# Patient Record
Sex: MALE | Race: WHITE | NOT HISPANIC OR LATINO | Employment: FULL TIME | URBAN - METROPOLITAN AREA
[De-identification: names, ages, dates, MRNs, and addresses within clinical notes are randomized per-mention and may not be internally consistent; named-entity substitution may affect disease eponyms.]

---

## 2020-07-30 ENCOUNTER — OFFICE VISIT (OUTPATIENT)
Dept: SURGERY | Facility: CLINIC | Age: 55
End: 2020-07-30
Payer: COMMERCIAL

## 2020-07-30 VITALS
DIASTOLIC BLOOD PRESSURE: 84 MMHG | HEIGHT: 67 IN | HEART RATE: 72 BPM | BODY MASS INDEX: 40.12 KG/M2 | SYSTOLIC BLOOD PRESSURE: 148 MMHG | WEIGHT: 255.6 LBS | TEMPERATURE: 96.5 F

## 2020-07-30 DIAGNOSIS — E66.01 MORBID OBESITY (HCC): Primary | ICD-10-CM

## 2020-07-30 DIAGNOSIS — K42.9 UMBILICAL HERNIA WITHOUT OBSTRUCTION OR GANGRENE: ICD-10-CM

## 2020-07-30 PROCEDURE — 99203 OFFICE O/P NEW LOW 30 MIN: CPT | Performed by: SURGERY

## 2020-07-30 RX ORDER — CITALOPRAM 40 MG/1
1 TABLET ORAL DAILY
COMMUNITY
Start: 2020-07-06

## 2020-07-30 NOTE — PROGRESS NOTES
Fer Wells MD, 20     Gopi Floyd MRN: [id number] : 1965 Age: 54 y o  Assessment/Plan       Diagnoses and all orders for this visit:    Morbid obesity ([address] )  -Ambulatory referral to Weight Management; Future  -Encouraged patient to lose some weight and visit weight loss clinic    Umbilical hernia without obstruction or gangrene  -Ambulatory referral to Weight Management; Future  -Encouraged patient to lose some weight and visit weight loss clinic  -Advised patient to return once he has lost weight to evaluate again for surgery      Patient has hemorrhoids which are painful during wiping for two years  Advised him to f/u with PCP for topical treatments and to consume a high fiber diet  Gopi Floyd acknowledged understanding of treatment plan, all questions answered  Plan discussed with attending physician Dr Kanika Conteh is a 54 y o  male who presents today with a chief complaint of an umbilical hernia  Patient stated he has had this hernia for the past 20 years but it has been bothering him for 6 months  He stated when he leans upon something or pushes up against the hernia it bothers him  He also stated he has had worsened nausea over the past 6 months  Patient reported a 40 lb weight gain over the past year and an increased weight gain of 10 lbs over the past month  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  Patient also stated he has had hemorrhoids for a couple years  They are painful during wiping and he feels they are getting bigger  Denies fever, chills, and recent sick contacts  Past Surgical History:   Procedure Laterality Date    KNEE ARTHROSCOPY Right     TONSILLECTOMY         Current Outpatient Medications   Medication Sig Dispense Refill    citalopram (CeleXA) 40 mg tablet Take 1 mg by mouth daily       No current facility-administered medications for this visit          Review of Systems   Constitutional: Negative for chills, fatigue and fever  HENT: Negative for congestion  Respiratory: Positive for shortness of breath (slight SOB since weight gain)  Negative for cough  Cardiovascular: Negative for chest pain  Gastrointestinal: Positive for abdominal pain and nausea  Negative for constipation, diarrhea and vomiting  Genitourinary: Negative for difficulty urinating  Objective      /84 (BP Location: Left arm, Patient Position: Sitting, Cuff Size: Large)   Pulse 72   Temp (!) 96 5 °F (35 8 °C) (Tympanic)   Ht 5' 7" (1 702 m)   Wt 116 kg (255 lb 9 6 oz)   BMI 40 03 kg/m²     Physical Exam   Constitutional: He is oriented to person, place, and time  He appears well-developed and well-nourished  HENT:   Head: Normocephalic and atraumatic  Cardiovascular: Normal rate, regular rhythm and normal heart sounds  Pulmonary/Chest: Effort normal and breath sounds normal    Abdominal: Soft  Bowel sounds are normal  A hernia (freely reducible umbilical hernia) is present  Neurological: He is alert and oriented to person, place, and time  Skin: Skin is warm and dry  Psychiatric: He has a normal mood and affect  His behavior is normal  Judgment and thought content normal            Some portions of this record may have been generated with voice recognition software  There may be translation, syntax, or grammatical errors  Occasional wrong word or "sound-a-like" substitutions may have occurred due to the inherent limitations of the voice recognition software  Read the chart carefully and recognize, using context, where substations may have occurred  If you have any questions, please contact the dictating provider for clarification or correction, as needed

## 2020-08-13 RX ORDER — CITALOPRAM 20 MG/1
20 TABLET ORAL DAILY
COMMUNITY

## 2020-08-13 RX ORDER — ROSUVASTATIN CALCIUM 10 MG/1
10 TABLET, COATED ORAL
COMMUNITY